# Patient Record
Sex: MALE | Race: ASIAN | NOT HISPANIC OR LATINO | URBAN - METROPOLITAN AREA
[De-identification: names, ages, dates, MRNs, and addresses within clinical notes are randomized per-mention and may not be internally consistent; named-entity substitution may affect disease eponyms.]

---

## 2021-10-07 ENCOUNTER — EMERGENCY (EMERGENCY)
Facility: HOSPITAL | Age: 41
LOS: 1 days | Discharge: LEFT BEFORE DISCHARGE | End: 2021-10-07
Attending: EMERGENCY MEDICINE | Admitting: EMERGENCY MEDICINE
Payer: COMMERCIAL

## 2021-10-07 VITALS
WEIGHT: 169.98 LBS | RESPIRATION RATE: 16 BRPM | OXYGEN SATURATION: 97 % | HEART RATE: 72 BPM | HEIGHT: 69 IN | DIASTOLIC BLOOD PRESSURE: 83 MMHG | TEMPERATURE: 98 F | SYSTOLIC BLOOD PRESSURE: 136 MMHG

## 2021-10-07 DIAGNOSIS — R10.13 EPIGASTRIC PAIN: ICD-10-CM

## 2021-10-07 DIAGNOSIS — R42 DIZZINESS AND GIDDINESS: ICD-10-CM

## 2021-10-07 DIAGNOSIS — R61 GENERALIZED HYPERHIDROSIS: ICD-10-CM

## 2021-10-07 DIAGNOSIS — R11.2 NAUSEA WITH VOMITING, UNSPECIFIED: ICD-10-CM

## 2021-10-07 PROCEDURE — 99284 EMERGENCY DEPT VISIT MOD MDM: CPT

## 2021-10-07 RX ORDER — SODIUM CHLORIDE 9 MG/ML
1000 INJECTION INTRAMUSCULAR; INTRAVENOUS; SUBCUTANEOUS ONCE
Refills: 0 | Status: DISCONTINUED | OUTPATIENT
Start: 2021-10-07 | End: 2021-10-11

## 2021-10-07 NOTE — ED ADULT TRIAGE NOTE - CHIEF COMPLAINT QUOTE
Pt presents c/o abdo pain, N/V/D since eating "raw fish" today.  Pt denies CP, SOB, cough, fever/chills.

## 2021-10-07 NOTE — ED PROVIDER NOTE - PHYSICAL EXAMINATION
GENERAL: Awake, alert, NAD  HEENT: NC/AT, moist mucous membranes, PERRL, EOMI  LUNGS: CTAB, no wheezes or crackles   CARDIAC: RRR, no m/r/g  ABDOMEN: Soft, normal BS, non tender, non distended, no rebound, no guarding  BACK: No midline spinal tenderness, no CVA tenderness  EXT: No edema, no calf tenderness, 2+ DP pulses bilaterally, no deformities.  NEURO: A&Ox3. Moving all extremities.  SKIN: Warm and dry. mildly diaphoretic.  PSYCH: Normal affect.

## 2021-10-07 NOTE — ED PROVIDER NOTE - OBJECTIVE STATEMENT
41 y/o M with no significant PMH presenting to the ED c/o abdominal pain. He ate sushi around 4pm and around 4:30 had sudden onset of abdominal discomfort, mostly epigastric. He went to the bathroom to try to have a bowel movement but felt lightheaded and had an episode of vomiting. No diarrhea. No chest pain or shortness of breath. No urinary symptoms. He eats the same sushi daily, no one else ate it today. He states the pain and nausea have now resolved although he feels more weak at this time.

## 2021-10-07 NOTE — ED PROVIDER NOTE - PROGRESS NOTE DETAILS
Patient walked out before labs or EKG could be performed, states he feels better and does not want to stay. Heriberto Riggins, DO PGY3

## 2021-10-07 NOTE — ED PROVIDER NOTE - CLINICAL SUMMARY MEDICAL DECISION MAKING FREE TEXT BOX
39 y/o M with no significant PMH presenting to the ED c/o abdominal pain, nausea, and vomiting. Vitals stable. Patient well appearing in no acute distress. No significant abdominal tenderness. No risk factors for ACS. Will obtain basic labs, EKG, dose fluids, reassess. Heriberto Riggins, DO PGY3

## 2021-10-07 NOTE — ED PROVIDER NOTE - ATTENDING CONTRIBUTION TO CARE
Pt evaluated for abd discomfort and nausea after eating some sushi. After evaluation pt decided to leave ED without completing tx.